# Patient Record
Sex: MALE | Race: WHITE | NOT HISPANIC OR LATINO | URBAN - METROPOLITAN AREA
[De-identification: names, ages, dates, MRNs, and addresses within clinical notes are randomized per-mention and may not be internally consistent; named-entity substitution may affect disease eponyms.]

---

## 2024-05-22 ENCOUNTER — OFFICE (OUTPATIENT)
Dept: URBAN - METROPOLITAN AREA CLINIC 15 | Facility: CLINIC | Age: 64
End: 2024-05-22
Payer: MEDICARE

## 2024-05-22 VITALS
WEIGHT: 315 LBS | OXYGEN SATURATION: 97 % | HEIGHT: 72 IN | DIASTOLIC BLOOD PRESSURE: 74 MMHG | DIASTOLIC BLOOD PRESSURE: 78 MMHG | SYSTOLIC BLOOD PRESSURE: 145 MMHG | HEART RATE: 75 BPM | SYSTOLIC BLOOD PRESSURE: 142 MMHG | TEMPERATURE: 98.6 F

## 2024-05-22 DIAGNOSIS — K22.70 BARRETT'S ESOPHAGUS WITHOUT DYSPLASIA: ICD-10-CM

## 2024-05-22 DIAGNOSIS — Z86.010 PERSONAL HISTORY OF COLONIC POLYPS: ICD-10-CM

## 2024-05-22 DIAGNOSIS — K58.2 MIXED IRRITABLE BOWEL SYNDROME: ICD-10-CM

## 2024-05-22 DIAGNOSIS — E83.110 HEREDITARY HEMOCHROMATOSIS: ICD-10-CM

## 2024-05-22 DIAGNOSIS — K75.81 NONALCOHOLIC STEATOHEPATITIS (NASH): ICD-10-CM

## 2024-05-22 DIAGNOSIS — K21.9 GASTRO-ESOPHAGEAL REFLUX DISEASE WITHOUT ESOPHAGITIS: ICD-10-CM

## 2024-05-22 PROCEDURE — 99214 OFFICE O/P EST MOD 30 MIN: CPT | Performed by: CLINICAL NURSE SPECIALIST

## 2024-05-22 NOTE — SERVICEROSSYSTEMNOTES
Has been suffering from severe fatigue weight gain since past October. Fatigue is starting to improve since May